# Patient Record
Sex: MALE
[De-identification: names, ages, dates, MRNs, and addresses within clinical notes are randomized per-mention and may not be internally consistent; named-entity substitution may affect disease eponyms.]

---

## 2020-10-02 ENCOUNTER — RECORD ABSTRACTING (OUTPATIENT)
Age: 65
End: 2020-10-02

## 2020-10-02 DIAGNOSIS — Z86.69 PERSONAL HISTORY OF OTHER DISEASES OF THE NERVOUS SYSTEM AND SENSE ORGANS: ICD-10-CM

## 2020-10-02 DIAGNOSIS — Z87.891 PERSONAL HISTORY OF NICOTINE DEPENDENCE: ICD-10-CM

## 2020-10-02 DIAGNOSIS — H93.13 TINNITUS, BILATERAL: ICD-10-CM

## 2020-10-02 DIAGNOSIS — H61.23 IMPACTED CERUMEN, BILATERAL: ICD-10-CM

## 2020-10-02 DIAGNOSIS — Z87.09 PERSONAL HISTORY OF OTHER DISEASES OF THE RESPIRATORY SYSTEM: ICD-10-CM

## 2020-10-02 DIAGNOSIS — H93.013 TRANSIENT ISCHEMIC DEAFNESS, BILATERAL: ICD-10-CM

## 2020-10-02 DIAGNOSIS — R42 DIZZINESS AND GIDDINESS: ICD-10-CM

## 2020-10-02 DIAGNOSIS — H90.3 SENSORINEURAL HEARING LOSS, BILATERAL: ICD-10-CM

## 2020-10-02 RX ORDER — ROSUVASTATIN CALCIUM 20 MG/1
20 TABLET, FILM COATED ORAL
Refills: 0 | Status: ACTIVE | COMMUNITY

## 2021-01-15 ENCOUNTER — APPOINTMENT (OUTPATIENT)
Dept: OTOLARYNGOLOGY | Facility: CLINIC | Age: 66
End: 2021-01-15
Payer: MEDICARE

## 2021-01-15 VITALS
DIASTOLIC BLOOD PRESSURE: 71 MMHG | HEART RATE: 78 BPM | HEIGHT: 66 IN | TEMPERATURE: 97.1 F | SYSTOLIC BLOOD PRESSURE: 113 MMHG | WEIGHT: 145 LBS | BODY MASS INDEX: 23.3 KG/M2

## 2021-01-15 DIAGNOSIS — Z86.79 PERSONAL HISTORY OF OTHER DISEASES OF THE CIRCULATORY SYSTEM: ICD-10-CM

## 2021-01-15 PROCEDURE — 99213 OFFICE O/P EST LOW 20 MIN: CPT

## 2021-01-15 RX ORDER — ASPIRIN 81 MG/1
81 TABLET, CHEWABLE ORAL
Refills: 0 | Status: ACTIVE | COMMUNITY
Start: 2021-01-15

## 2021-01-15 NOTE — PHYSICAL EXAM
[Veras Test Lateralizes To Right] : tone lateralization to the right [Midline] : trachea located in midline position [Normal] : no rashes [FreeTextEntry8] : dry, flaky cerumen removed via suction [FreeTextEntry9] : dry, flaky cerumen removed via suction [FreeTextEntry1] : severe leftward deviated septum

## 2021-01-15 NOTE — ADDENDUM
[FreeTextEntry1] : Documented by Monserrat Horton acting as scribe for Dr. Barclay on 01/15/2021.\par \par All Medical record entries made by the Scribe were at my, Dr. Barclay, direction and personally dictated by me on 01/15/2021 . I have reviewed the chart and agree that the record accurately reflects my personal performance of the history, physical exam, assessment and plan. I have also personally directed, reviewed, and agreed with the discharge instructions.

## 2021-01-15 NOTE — HISTORY OF PRESENT ILLNESS
[de-identified] : The patient presents with h/o bilateral cerumen impactions, tinnitus, mild bilateral HF SNHL. Pt presents today for cerumen removal. He states that he hasn't been getting tinnitus. Pt hasn't had any sx of Menieres in a few years. He doesn't add salt to any foods but if he eats food out he eats it as it comes. He hasn't experienced vertigo for a while either.

## 2021-01-15 NOTE — ASSESSMENT
[FreeTextEntry1] : Reviewed and reconciled medications, allergies, PMHx, PSHx, SocHx, FMHx.\par \par h/o Menieres disease, vertigo, bilateral cerumen impactions, tinnitus, mild bilateral HF SNHL - tinnitus resolved, no Menieres disease or vertigo sx\par bilateral OE\par \par Audio 7/17/2020: mild HF SNHL, 96% discrimination at 45 db\par \par Plan:\par Cerumen removed. Reviewed previous audio results. FU 1 year with audio.

## 2021-01-15 NOTE — CONSULT LETTER
[Dear  ___] : Dear  [unfilled], [Courtesy Letter:] : I had the pleasure of seeing your patient, [unfilled], in my office today. [Please see my note below.] : Please see my note below. [Sincerely,] : Sincerely, [Consult Closing:] : Thank you very much for allowing me to participate in the care of this patient.  If you have any questions, please do not hesitate to contact me. [FreeTextEntry3] : Vivek Barclay MD FACS

## 2021-01-15 NOTE — REVIEW OF SYSTEMS
[Seasonal Allergies] : seasonal allergies [Post Nasal Drip] : post nasal drip [Dizziness] : dizziness [Vertigo] : vertigo [Lightheadedness] : lightheadedness

## 2021-07-12 ENCOUNTER — APPOINTMENT (OUTPATIENT)
Dept: OTOLARYNGOLOGY | Facility: CLINIC | Age: 66
End: 2021-07-12

## 2021-08-02 ENCOUNTER — APPOINTMENT (OUTPATIENT)
Dept: OTOLARYNGOLOGY | Facility: CLINIC | Age: 66
End: 2021-08-02
Payer: MEDICARE

## 2021-08-02 VITALS
HEIGHT: 66 IN | WEIGHT: 145 LBS | BODY MASS INDEX: 23.3 KG/M2 | SYSTOLIC BLOOD PRESSURE: 109 MMHG | DIASTOLIC BLOOD PRESSURE: 65 MMHG | HEART RATE: 80 BPM

## 2021-08-02 DIAGNOSIS — H60.8X3 OTHER OTITIS EXTERNA, BILATERAL: ICD-10-CM

## 2021-08-02 DIAGNOSIS — H90.5 UNSPECIFIED SENSORINEURAL HEARING LOSS: ICD-10-CM

## 2021-08-02 DIAGNOSIS — H81.09 MENIERE'S DISEASE, UNSPECIFIED EAR: ICD-10-CM

## 2021-08-02 DIAGNOSIS — J34.2 DEVIATED NASAL SEPTUM: ICD-10-CM

## 2021-08-02 PROCEDURE — 99214 OFFICE O/P EST MOD 30 MIN: CPT

## 2021-08-02 PROCEDURE — 92567 TYMPANOMETRY: CPT

## 2021-08-02 PROCEDURE — 92557 COMPREHENSIVE HEARING TEST: CPT

## 2021-08-02 RX ORDER — VALSARTAN 40 MG/1
40 TABLET, COATED ORAL
Qty: 90 | Refills: 0 | Status: ACTIVE | COMMUNITY
Start: 2021-05-30

## 2021-08-02 RX ORDER — CLINDAMYCIN PHOSPHATE 10 MG/ML
1 LOTION TOPICAL
Qty: 60 | Refills: 0 | Status: ACTIVE | COMMUNITY
Start: 2021-04-13

## 2021-08-02 RX ORDER — FENOFIBRATE 160 MG/1
160 TABLET ORAL
Qty: 60 | Refills: 0 | Status: ACTIVE | COMMUNITY
Start: 2021-05-17

## 2021-08-02 RX ORDER — TAMSULOSIN HYDROCHLORIDE 0.4 MG/1
0.4 CAPSULE ORAL
Qty: 30 | Refills: 0 | Status: ACTIVE | COMMUNITY
Start: 2021-06-07

## 2021-08-02 RX ORDER — ALFUZOSIN HYDROCHLORIDE 10 MG/1
10 TABLET, EXTENDED RELEASE ORAL
Qty: 30 | Refills: 0 | Status: ACTIVE | COMMUNITY
Start: 2021-07-21

## 2021-08-02 RX ORDER — FOLIC ACID 1 MG/1
1 TABLET ORAL
Qty: 90 | Refills: 0 | Status: ACTIVE | COMMUNITY
Start: 2021-05-26

## 2021-08-02 RX ORDER — VALSARTAN 160 MG/1
160 TABLET, COATED ORAL
Refills: 0 | Status: DISCONTINUED | COMMUNITY
End: 2021-08-02

## 2021-08-02 RX ORDER — FENOFIBRATE 150 MG/1
150 CAPSULE ORAL
Refills: 0 | Status: DISCONTINUED | COMMUNITY
End: 2021-08-02

## 2021-08-02 NOTE — ADDENDUM
[FreeTextEntry1] : Documented by Chris Bello acting as scribe for Dr. Barclay on 08/02/2021.\par \par All Medical record entries made by the Scribe were at my, Dr. Barclay, direction and personally dictated by me on 08/02/2021 . I have reviewed the chart and agree that the record accurately reflects my personal performance of the history, physical exam, assessment and plan. I have also personally directed, reviewed, and agreed with the discharge instructions.

## 2021-08-02 NOTE — ASSESSMENT
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 TPP AD 4, AS 6. 100% discrimination at 45db AD. 96% discrimination at 45db AS. Bilateral mild SNHL 3-8kHz.  No change since 7/17/2020. \par \par Plan:\par Cerumen removed. Audio - results interpreted by Dr. Barclay and reviewed with the patient. FU 6 months.

## 2021-08-02 NOTE — CONSULT LETTER
[Dear  ___] : Dear  [unfilled], [Courtesy Letter:] : I had the pleasure of seeing your patient, [unfilled], in my office today. [Please see my note below.] : Please see my note below. [Consult Closing:] : Thank you very much for allowing me to participate in the care of this patient.  If you have any questions, please do not hesitate to contact me. [Sincerely,] : Sincerely, [FreeTextEntry3] : Vivek Barclay MD FACS

## 2021-08-02 NOTE — DATA REVIEWED
[de-identified] : Type A tymps AU\par WNL, 250-2000 Hz, mild SNHL, 8042-5889 Hz. No change since 7/17/20\par REC: ENT f/u\par

## 2021-08-02 NOTE — HISTORY OF PRESENT ILLNESS
[de-identified] : The patient presents with h/o bilateral cerumen impactions, tinnitus, mild bilateral HF SNHL. The pt denies change in hearing, dizziness or tinnitus. He states his Menieres disease has been  controlled. The pt is monitoring his salt intake.

## 2021-08-02 NOTE — PHYSICAL EXAM
[Veras Test Lateralizes To Right] : tone lateralization to the right [Midline] : trachea is located in midline position [Clear / Open well] : hypopharynx is clear and opens well [Normal] : no masses and lesions seen, face is symmetric [FreeTextEntry6] : cerumen removed via curettage  [FreeTextEntry7] : cerumen removed via curettage  [FreeTextEntry1] : leukoplakia bilaterally posterior on the cheeks [FreeTextEntry2] : sinuses nontender to percussion

## 2022-02-01 ENCOUNTER — APPOINTMENT (OUTPATIENT)
Dept: OTOLARYNGOLOGY | Facility: CLINIC | Age: 67
End: 2022-02-01

## 2022-02-08 ENCOUNTER — APPOINTMENT (OUTPATIENT)
Dept: OTOLARYNGOLOGY | Facility: CLINIC | Age: 67
End: 2022-02-08

## 2022-03-08 ENCOUNTER — APPOINTMENT (OUTPATIENT)
Dept: OTOLARYNGOLOGY | Facility: CLINIC | Age: 67
End: 2022-03-08

## 2022-12-17 ENCOUNTER — NON-APPOINTMENT (OUTPATIENT)
Age: 67
End: 2022-12-17

## 2024-02-04 ENCOUNTER — OFFICE (OUTPATIENT)
Dept: URBAN - METROPOLITAN AREA CLINIC 109 | Facility: CLINIC | Age: 69
Setting detail: OPHTHALMOLOGY
End: 2024-02-04
Payer: MEDICARE

## 2024-02-04 ENCOUNTER — RX ONLY (RX ONLY)
Age: 69
End: 2024-02-04

## 2024-02-04 DIAGNOSIS — H53.10: ICD-10-CM

## 2024-02-04 DIAGNOSIS — H25.13: ICD-10-CM

## 2024-02-04 DIAGNOSIS — H40.033: ICD-10-CM

## 2024-02-04 PROCEDURE — 92083 EXTENDED VISUAL FIELD XM: CPT | Performed by: OPHTHALMOLOGY

## 2024-02-04 PROCEDURE — 92004 COMPRE OPH EXAM NEW PT 1/>: CPT | Performed by: OPHTHALMOLOGY

## 2024-02-04 PROCEDURE — 92020 GONIOSCOPY: CPT | Performed by: OPHTHALMOLOGY

## 2024-02-04 ASSESSMENT — SPHEQUIV_DERIVED
OS_SPHEQUIV: 3.625
OD_SPHEQUIV: 3.375

## 2024-02-04 ASSESSMENT — REFRACTION_AUTOREFRACTION
OS_SPHERE: +3.75
OD_CYLINDER: -0.75
OD_AXIS: 105
OS_AXIS: 071
OS_CYLINDER: -0.25
OD_SPHERE: +3.75

## 2024-02-04 ASSESSMENT — REFRACTION_CURRENTRX
OS_ADD: +1.75
OD_SPHERE: +3.25
OD_CYLINDER: SPHERE
OD_ADD: +1.75
OS_SPHERE: +3.50
OD_OVR_VA: 20/
OS_CYLINDER: -0.50
OS_AXIS: 179
OS_OVR_VA: 20/
OD_VPRISM_DIRECTION: PROGS
OS_VPRISM_DIRECTION: PROGS

## 2024-02-04 ASSESSMENT — CONFRONTATIONAL VISUAL FIELD TEST (CVF)
OS_FINDINGS: FULL
OD_FINDINGS: FULL